# Patient Record
Sex: MALE | URBAN - NONMETROPOLITAN AREA
[De-identification: names, ages, dates, MRNs, and addresses within clinical notes are randomized per-mention and may not be internally consistent; named-entity substitution may affect disease eponyms.]

---

## 2023-03-27 ENCOUNTER — APPOINTMENT (OUTPATIENT)
Dept: URBAN - NONMETROPOLITAN AREA CLINIC 59 | Age: 63
Setting detail: DERMATOLOGY
End: 2023-04-07

## 2023-03-27 DIAGNOSIS — D22 MELANOCYTIC NEVI: ICD-10-CM

## 2023-03-27 DIAGNOSIS — D18.0 HEMANGIOMA: ICD-10-CM

## 2023-03-27 DIAGNOSIS — D49.2 NEOPLASM OF UNSPECIFIED BEHAVIOR OF BONE, SOFT TISSUE, AND SKIN: ICD-10-CM

## 2023-03-27 DIAGNOSIS — L81.4 OTHER MELANIN HYPERPIGMENTATION: ICD-10-CM

## 2023-03-27 DIAGNOSIS — L57.0 ACTINIC KERATOSIS: ICD-10-CM

## 2023-03-27 DIAGNOSIS — Z85.828 PERSONAL HISTORY OF OTHER MALIGNANT NEOPLASM OF SKIN: ICD-10-CM

## 2023-03-27 DIAGNOSIS — L20.89 OTHER ATOPIC DERMATITIS: ICD-10-CM

## 2023-03-27 DIAGNOSIS — L82.1 OTHER SEBORRHEIC KERATOSIS: ICD-10-CM

## 2023-03-27 PROBLEM — D22.5 MELANOCYTIC NEVI OF TRUNK: Status: ACTIVE | Noted: 2023-03-27

## 2023-03-27 PROBLEM — D18.01 HEMANGIOMA OF SKIN AND SUBCUTANEOUS TISSUE: Status: ACTIVE | Noted: 2023-03-27

## 2023-03-27 PROBLEM — L20.84 INTRINSIC (ALLERGIC) ECZEMA: Status: ACTIVE | Noted: 2023-03-27

## 2023-03-27 PROCEDURE — OTHER COUNSELING: OTHER

## 2023-03-27 PROCEDURE — OTHER MIPS QUALITY: OTHER

## 2023-03-27 PROCEDURE — OTHER PRESCRIPTION MEDICATION MANAGEMENT: OTHER

## 2023-03-27 PROCEDURE — 17003 DESTRUCT PREMALG LES 2-14: CPT

## 2023-03-27 PROCEDURE — 17000 DESTRUCT PREMALG LESION: CPT | Mod: 59

## 2023-03-27 PROCEDURE — 11102 TANGNTL BX SKIN SINGLE LES: CPT | Mod: 59

## 2023-03-27 PROCEDURE — 99213 OFFICE O/P EST LOW 20 MIN: CPT | Mod: 25

## 2023-03-27 PROCEDURE — OTHER LIQUID NITROGEN: OTHER

## 2023-03-27 PROCEDURE — A4550 SURGICAL TRAYS: HCPCS

## 2023-03-27 PROCEDURE — OTHER SHAVE REMOVAL: OTHER

## 2023-03-27 PROCEDURE — 11301 SHAVE SKIN LESION 0.6-1.0 CM: CPT

## 2023-03-27 PROCEDURE — OTHER BIOPSY BY SHAVE METHOD: OTHER

## 2023-03-27 PROCEDURE — OTHER PRESCRIPTION: OTHER

## 2023-03-27 PROCEDURE — 11103 TANGNTL BX SKIN EA SEP/ADDL: CPT

## 2023-03-27 PROCEDURE — OTHER MEDICATION COUNSELING: OTHER

## 2023-03-27 RX ORDER — BETAMETHASONE DIPROPIONATE 0.5 MG/G
CREAM TOPICAL BID
Qty: 45 | Refills: 1 | Status: ERX | COMMUNITY
Start: 2023-03-27

## 2023-03-27 ASSESSMENT — LOCATION SIMPLE DESCRIPTION DERM
LOCATION SIMPLE: RIGHT CHEEK
LOCATION SIMPLE: CHEST
LOCATION SIMPLE: LEFT EYEBROW
LOCATION SIMPLE: ABDOMEN
LOCATION SIMPLE: LEFT CHEEK
LOCATION SIMPLE: LEFT BREAST
LOCATION SIMPLE: RIGHT CALF
LOCATION SIMPLE: LEFT CALF
LOCATION SIMPLE: LEFT THIGH

## 2023-03-27 ASSESSMENT — LOCATION DETAILED DESCRIPTION DERM
LOCATION DETAILED: RIGHT SUPERIOR CENTRAL MALAR CHEEK
LOCATION DETAILED: LEFT ANTERIOR PROXIMAL THIGH
LOCATION DETAILED: MIDDLE STERNUM
LOCATION DETAILED: LEFT SUPERIOR CENTRAL MALAR CHEEK
LOCATION DETAILED: PERIUMBILICAL SKIN
LOCATION DETAILED: LEFT LATERAL ABDOMEN
LOCATION DETAILED: RIGHT PROXIMAL CALF
LOCATION DETAILED: LEFT PROXIMAL CALF
LOCATION DETAILED: LOWER STERNUM
LOCATION DETAILED: LEFT CENTRAL EYEBROW
LOCATION DETAILED: LEFT MEDIAL BREAST 8-9:00 REGION

## 2023-03-27 ASSESSMENT — LOCATION ZONE DERM
LOCATION ZONE: FACE
LOCATION ZONE: TRUNK
LOCATION ZONE: LEG

## 2023-03-27 NOTE — PROCEDURE: PRESCRIPTION MEDICATION MANAGEMENT
Initiate Treatment: Betamethasone cream AAA BID 2 weeks on 2 weeks off \\nVaseline to cracks, fold of the skin
Detail Level: Zone
Render In Strict Bullet Format?: No

## 2023-03-27 NOTE — PROCEDURE: LIQUID NITROGEN
Consent: The patient's consent was obtained including but not limited to risks of crusting, scabbing, blistering, scarring, darker or lighter pigmentary change, recurrence, incomplete removal and infection.
Duration Of Freeze Thaw-Cycle (Seconds): 5
Detail Level: Detailed
Render Post-Care Instructions In Note?: no
Post-Care Instructions: I reviewed with the patient in detail post-care instructions. Patient is to wear sunprotection, and avoid picking at any of the treated lesions. Pt may apply Vaseline to crusted or scabbing areas.
Show Aperture Variable?: Yes
Number Of Freeze-Thaw Cycles: 3 freeze-thaw cycles
Application Tool (Optional): Liquid Nitrogen Sprayer

## 2023-03-27 NOTE — PROCEDURE: SHAVE REMOVAL
Notification Instructions: Patient will be notified of pathology results. However, patient instructed to call the office if not contacted within 2 weeks.
Medical Necessity Information: It is in your best interest to select a reason for this procedure from the list below. All of these items fulfill various CMS LCD requirements except the new and changing color options.
Consent was obtained from the patient. The risks and benefits to therapy were discussed in detail. Specifically, the risks of infection, scarring, bleeding, prolonged wound healing, incomplete removal, allergy to anesthesia, nerve injury and recurrence were addressed. Prior to the procedure, the treatment site was clearly identified and confirmed by the patient. All components of Universal Protocol/PAUSE Rule completed.
Wound Care: Petrolatum
Hide Shave Removal Depth?: No
Depth Of Shave: dermis
Body Location Override (Optional - Billing Will Still Be Based On Selected Body Map Location If Applicable): left pubic area
Anesthesia Type: 1% lidocaine with epinephrine
Detail Level: Detailed
X Size Of Lesion In Cm (Optional): 0
Size Of Lesion In Cm (Required): 0.6
Post-Care Instructions: I reviewed with the patient in detail post-care instructions. Patient is to keep the biopsy site dry overnight, and then apply bacitracin twice daily until healed. Patient may apply hydrogen peroxide soaks to remove any crusting.
Medical Necessity Clause: This procedure was medically necessary because the lesion that was treated was:
Hemostasis: Drysol
Biopsy Method: Personna blade
Was A Bandage Applied: Yes
Billing Type: Third-Party Bill

## 2023-03-27 NOTE — PROCEDURE: BIOPSY BY SHAVE METHOD
Hide Accession Number?: No
Hemostasis: Drysol
Anesthesia Type: 1% lidocaine with epinephrine
Biopsy Method: Personna blade
Billing Type: Third-Party Bill
Consent: Written consent was obtained and risks were reviewed including but not limited to scarring, infection, bleeding, scabbing, incomplete removal, nerve damage and allergy to anesthesia.
Cryotherapy Text: The wound bed was treated with cryotherapy after the biopsy was performed.
Post-Care Instructions: I reviewed with the patient in detail post-care instructions. Patient is to keep the biopsy site dry overnight, and then apply bacitracin twice daily until healed. Patient may apply hydrogen peroxide soaks to remove any crusting.
Curettage Text: The wound bed was treated with curettage after the biopsy was performed.
Information: Selecting Yes will display possible errors in your note based on the variables you have selected. This validation is only offered as a suggestion for you. PLEASE NOTE THAT THE VALIDATION TEXT WILL BE REMOVED WHEN YOU FINALIZE YOUR NOTE. IF YOU WANT TO FAX A PRELIMINARY NOTE YOU WILL NEED TO TOGGLE THIS TO 'NO' IF YOU DO NOT WANT IT IN YOUR FAXED NOTE.
Electrodesiccation And Curettage Text: The wound bed was treated with electrodesiccation and curettage after the biopsy was performed.
Electrodesiccation Text: The wound bed was treated with electrodesiccation after the biopsy was performed.
Silver Nitrate Text: The wound bed was treated with silver nitrate after the biopsy was performed.
Bill For Surgical Tray: yes
Biopsy Type: H and E
Additional Anesthesia Volume In Cc (Will Not Render If 0): 0
Detail Level: Detailed
Notification Instructions: Patient will be notified of biopsy results. However, patient instructed to call the office if not contacted within 2 weeks.
Depth Of Biopsy: dermis
Dressing: bandage
Body Location Override (Optional - Billing Will Still Be Based On Selected Body Map Location If Applicable): left lower abdomen superior
Body Location Override (Optional - Billing Will Still Be Based On Selected Body Map Location If Applicable): right malar cheek
Anesthesia Volume In Cc (Will Not Render If 0): 0.5
Wound Care: Petrolatum
Size Of Lesion In Cm: 1
Type Of Destruction Used: Curettage
Body Location Override (Optional - Billing Will Still Be Based On Selected Body Map Location If Applicable): left lower abdomen inferior

## 2023-05-03 ENCOUNTER — APPOINTMENT (OUTPATIENT)
Dept: URBAN - NONMETROPOLITAN AREA CLINIC 59 | Age: 63
Setting detail: DERMATOLOGY
End: 2023-05-03

## 2023-05-03 PROBLEM — C44.329 SQUAMOUS CELL CARCINOMA OF SKIN OF OTHER PARTS OF FACE: Status: ACTIVE | Noted: 2023-05-03

## 2023-05-03 PROCEDURE — OTHER MOHS SURGERY: OTHER

## 2023-05-03 PROCEDURE — A4550 SURGICAL TRAYS: HCPCS

## 2023-05-03 PROCEDURE — OTHER PRESCRIPTION MEDICATION MANAGEMENT: OTHER

## 2023-05-03 PROCEDURE — OTHER COUNSELING: OTHER

## 2023-05-03 PROCEDURE — OTHER PRESCRIPTION: OTHER

## 2023-05-03 PROCEDURE — 17311 MOHS 1 STAGE H/N/HF/G: CPT

## 2023-05-03 PROCEDURE — 14041 TIS TRNFR F/C/C/M/N/A/G/H/F: CPT

## 2023-05-03 RX ORDER — AMOXICILLIN 500 MG/1
CAPSULE ORAL
Qty: 21 | Refills: 0 | Status: ERX | COMMUNITY
Start: 2023-05-03

## 2023-05-03 NOTE — PROCEDURE: MOHS SURGERY
I left detailed message asking patient to return office call to schedule to have her glycohemoglobin checked.   Epidermal Closure Graft Donor Site (Optional): simple interrupted

## 2023-05-03 NOTE — PROCEDURE: PRESCRIPTION MEDICATION MANAGEMENT
Detail Level: Zone
Render In Strict Bullet Format?: No
Initiate Treatment: Amoxicillin 500mg TID for one week

## 2023-05-03 NOTE — PROCEDURE: MOHS SURGERY
40.4 Hemigard Intro: Due to skin fragility and wound tension, it was decided to use HEMIGARD adhesive retention suture devices to permit a linear closure. The skin was cleaned and dried for a 6cm distance away from the wound. Excessive hair, if present, was removed to allow for adhesion.

## 2023-05-10 ENCOUNTER — APPOINTMENT (OUTPATIENT)
Dept: URBAN - NONMETROPOLITAN AREA CLINIC 59 | Age: 63
Setting detail: DERMATOLOGY
End: 2023-05-12

## 2023-05-10 PROBLEM — C44.529 SQUAMOUS CELL CARCINOMA OF SKIN OF OTHER PART OF TRUNK: Status: ACTIVE | Noted: 2023-05-10

## 2023-05-10 PROCEDURE — OTHER MEDICATION COUNSELING: OTHER

## 2023-05-10 PROCEDURE — 14301 TIS TRNFR ANY 30.1-60 SQ CM: CPT | Mod: 79

## 2023-05-10 PROCEDURE — OTHER PRESCRIPTION: OTHER

## 2023-05-10 PROCEDURE — 14302 TIS TRNFR ADDL 30 SQ CM: CPT | Mod: 79

## 2023-05-10 PROCEDURE — OTHER EXCISION: OTHER

## 2023-05-10 PROCEDURE — OTHER COUNSELING: OTHER

## 2023-05-10 PROCEDURE — A4550 SURGICAL TRAYS: HCPCS

## 2023-05-10 RX ORDER — AMOXICILLIN 500 MG/1
CAPSULE ORAL TID
Qty: 30 | Refills: 0 | Status: ERX

## 2023-05-10 NOTE — PROCEDURE: EXCISION
0 Hour(s) 30 Minute(s) Complex Repair And Dermal Autograft Text: The defect edges were debeveled with a #15 scalpel blade.  The primary defect was closed partially with a complex linear closure.  Given the location of the defect, shape of the defect and the proximity to free margins an dermal autograft was deemed most appropriate to repair the remaining defect.  The graft was trimmed to fit the size of the remaining defect.  The graft was then placed in the primary defect, oriented appropriately, and sutured into place.

## 2023-05-10 NOTE — PROCEDURE: MEDICATION COUNSELING
Rest  Fluids  Ibuprofen or tylenol as needed  Symptoms should improve gradually over the next 3-5 days.      If symptoms  persist or  worsen then the patient will need to reassessed.  If the patient cannot be seen by their PCP, then the patient is recommended to return to Immediate Care or to seek care in Emergency Room if Immediate Care is not available.              Rest   Fluids  tylenol as needed    Isolation recommended-see below.  Follow up with your primary in 5-7 days if issues persist.    Return if worsening symptoms or seek care in ER.          Covid-19 must be suspected for any person who has respiratory symptoms, unexplained fever, or other Covid-like symptoms at this time, and no test will determine with 100% certainty if you have COVID-19, so appropriate precautions should continue to be taken.        What does it mean if I have a negative test result?    A negative test result means that proteins from the virus that causes COVID-19 were not found in your sample. It is possible for this test to give a negative result that is incorrect (false negative) in some people with COVID19. This means that you could possibly still have COVID19 even though the test is negative.   The amount of antigen in a sample may decrease the longer you have symptoms of infection. Specimens collected after you have had symptoms for more than seven days may be more likely to be negative compared to a molecular assay.     What are the differences between antigen tests and other COVID-19 tests?  There are different kinds of tests for COVID-19.  Molecular tests (also known as PCR tests) detect  genetic material from the virus. Antigen tests detect proteins from the virus. Antigen tests are very specific for the virus, but are not as sensitive as molecular tests. This means that a positive result is highly accurate, but a negative result does not rule out infection.   the CDC currently recommends that you should stay home until  three things have happened:     • You have had no fever for at least 24 hours (no fever without the use of medicine that reduces fevers)    AND    • Other symptoms have improved (for example, when your cough or shortness of breath has Improved)    AND    • At least 10 days have passed since your symptoms first appeared.           What are the symptoms of COVID-19?  Symptoms usually start 4 or 5 days after a person is infected with the virus. But in some people, it can take up to 2 weeks for symptoms to appear. Some people never show symptoms at all.    When symptoms do happen, they can include:    ?Fever    ?Cough    ?Trouble breathing    ?Feeling tired    ?Shaking chills    ?Muscle aches    ?Headache    ?Sore throat    ?Problems with sense of smell or taste    Some people have digestive problems like nausea or diarrhea. There have also been some reports of rashes or other skin symptoms. For example, some people with COVID-19 get reddish-purple spots on their fingers or toes. But it's not clear why or how often this happens.    For most people, symptoms will get better within a few weeks. But in others, COVID-19 can lead to serious problems like pneumonia, not getting enough oxygen, heart problems, or even death. This risk gets higher as people get older. It is also higher in people who have other health problems like serious heart disease, chronic kidney disease, type 2 diabetes, chronic obstructive pulmonary disease (COPD), sickle cell disease, or obesity. People who have a weak immune system for other reasons (for example, HIV infection or certain medicines), asthma, cystic fibrosis, type 1 diabetes, or high blood pressure might also be at higher risk for serious problems.       Community Health Information for COVID-19 for you to review.         FOLLOW THE STEPS BELOW: If you are sick with COVID-19 or think you might have it, follow the steps below to help protect other people in your home and community.      1.Stay home except to get medical care    Stay home: People who are mildly ill with COVID-19 are able to recover at home. Do not leave, except to get medical care. Do not visit public areas.    Stay in touch with your doctor. Call before you get medical care. Be sure to get care if you feel worse or you think it is an emergency.    Avoid public transportation: Avoid using public transportation, ride-sharing, or taxis.     Separate yourself from other people in your home, this is known as home isolation     Stay away from others: As much as possible, you should stay in a specific \"sick room\" and away from other people in your home. Use a separate     bathroom, if available.      Limit contact with pets & animals: You should restrict contact with pets and other animals, just like you would around other people. Although there have not been reports of pets or other animals becoming sick with COVID-19, it is still recommended that people with the virus limit contact with animals until more information is known.   When possible, have another member of your household care for your animals while you are sick with COVID-19. If you must care for your pet or be around animals while you are sick, wash your hands before and after you interact with them. See COVID-19 and Animals for more information.     2.Call ahead before visiting your doctor    Call ahead: If you have a medical appointment, call your doctor's office or emergency department, and tell them you have or may have COVID-19.    This will help the office protect themselves and other patients.     3. Safety Precaution    Wear a facemask if you are sick   If you are sick: You should wear a facemask when you are around other people and before you enter a healthcare provider's office.   If you are caring for others: If the person who is sick is not able to wear a facemask (for example, because it causes trouble breathing), then people who live in the home should stay  in a different room. When caregivers enter the room of the sick person, they should wear a facemask. Visitors, other than caregivers, are not recommended.     Cover your coughs and sneezes   Cover: Cover your mouth and nose with a tissue when you cough or sneeze.   Dispose: Throw used tissues in a lined trash can.   Wash hands: Immediately wash your hands with soap and water for at least 20 seconds. If soap and water are not available, clean your hands with an alcohol-based hand  that contains at least 60% alcohol.     Clean your hands often   Wash hands: Wash your hands often with soap and water for at least 20 seconds. This is especially important after blowing your nose, coughing, or sneezing; going to the bathroom; and before eating or preparing food.   Hand : If soap and water are not available, use an alcohol-based hand  with at least 60% alcohol, covering all surfaces of your hands and rubbing them together until they feel dry.   Soap and water: Soap and water are the best option, especially if hands are visibly dirty.   Avoid touching: Avoid touching your eyes, nose, and mouth with unwashed hands.     Avoid sharing personal household items   Do not share: Do not share dishes, drinking glasses, cups, eating utensils, towels, or bedding with other people in your home.  Wash thoroughly after use: After using these items, wash them thoroughly with soap and water or put in the .     Clean all \"high-touch\" surfaces everyday   Clean high-touch surfaces in your isolation area (\"sick room\" and bathroom) every day; let a caregiver clean and disinfect high-touch surfaces in other areas of the home.     Clean and disinfect:   Routinely clean high-touch surfaces in your \"sick room\" and bathroom. Let someone else clean and disinfect surfaces in common areas, but not your bedroom and bathroom. If a caregiver or other person needs to clean and disinfect a sick person's bedroom or  bathroom, they should do so on an as-needed basis. The caregiver/other person should wear a mask and wait as long as possible after the sick person has used the bathroom.      High-touch surfaces include phones, remote controls, counters, tabletops, doorknobs, bathroom fixtures, toilets, keyboards, tablets, and bedside tables.      Clean and disinfect areas that may have blood, stool, or body fluids on them.      Household  and disinfectants:   Clean the area or item with soap and water or another detergent if it is dirty. Then, use a household disinfectant. Be sure to follow the instructions on the label to ensure safe and effective use of the product. Many products recommend keeping the surface wet for several minutes to ensure germs are killed. Many also recommend precautions such as wearing gloves and making sure you have good ventilation during use of the product.   Most EPA-registered household disinfectants should be effective. A full list of disinfectants can be found hereexternal icon.        4. Monitor your symptoms     • Seek medical attention, but call first: Seek medical care right away if your illness is worsening (for example, if you have difficulty breathing). Call your doctor before going in: Before going to the doctor's office or emergency room, call ahead and tell them your symptoms. They will tell you what to do.     • Wear a facemask: If possible, put on a facemask before you enter the building. If you can't put on a facemask, try to keep a safe distance from other people (at least 6 feet away). This will help protect the people in the office or waiting room.     • Follow care instructions from your healthcare provider and local health department: Your local health authorities will give instructions on checking your symptoms and reporting information.       5. When and How to discontinue home isolation     People with COVID-19 who have stayed home (home isolated) can stop home isolation  under the following conditions:     You can leave home after these three things have happened:     1. You have had no fever for at least 24  hours (that is three full days of no fever without the use medicine that reduces fevers) AND  2.  other symptoms have improved (for example, when your cough or shortness of breath have improved) AND  3.  at least 10 days have passed since your symptoms first appeared     In all cases, follow the guidance of your healthcare provider and local health department.   The decision to stop home isolation should be made in consultation with your healthcare provider and state and local health departments. Local decisions depend on local circumstances.       6. If you develop emergency warning signs for COVID-19 get medical attention immediately. Emergency warning signs include*:       Difficulty breathing or shortness of breath    Persistent pain or pressure in the chest    New confusion or inability to arouse    Bluish lips or face     *This list is not all inclusive. Please consult your medical provider for any other symptoms that are severe or concerning.  Call 911 if you have a medical emergency: If you have a medical emergency and need to call 911, notify the  that you have or think you might have, COVID-19. If possible, put on a facemask before medical help arrives.          Otezla Pregnancy And Lactation Text: This medication is Pregnancy Category C and it isn't known if it is safe during pregnancy. It is unknown if it is excreted in breast milk.

## 2023-05-10 NOTE — PROCEDURE: EXCISION
Body Location Override (Optional - Billing Will Still Be Based On Selected Body Map Location If Applicable): left lower abdomen inferior/superior

## 2023-05-10 NOTE — PROCEDURE: EXCISION
Path Notes (To The Dermatopathologist): Biopsy proven. Please check margins. Previous accession #[id number] Path Notes (To The Dermatopathologist): Biopsy proven. Please check margins. Previous accession #[id number]

## 2023-05-10 NOTE — PROCEDURE: MEDICATION COUNSELING
Event Note Event Note GIB/Event Note Nutrition Services Finasteride Pregnancy And Lactation Text: This medication is absolutely contraindicated during pregnancy. It is unknown if it is excreted in breast milk.

## 2023-05-10 NOTE — PROCEDURE: MEDICATION COUNSELING
All other VS stable. Tranexamic Acid Counseling:  Patient advised of the small risk of bleeding problems with tranexamic acid. They were also instructed to call if they developed any nausea, vomiting or diarrhea. All of the patient's questions and concerns were addressed.

## 2023-05-24 RX ORDER — AMOXICILLIN 500 MG/1
CAPSULE ORAL TID
Qty: 30 | Refills: 0 | Status: ERX

## 2023-05-25 ENCOUNTER — APPOINTMENT (OUTPATIENT)
Dept: URBAN - NONMETROPOLITAN AREA CLINIC 59 | Age: 63
Setting detail: DERMATOLOGY
End: 2023-05-29

## 2023-05-25 DIAGNOSIS — D22 MELANOCYTIC NEVI: ICD-10-CM

## 2023-05-25 PROBLEM — D22.72 MELANOCYTIC NEVI OF LEFT LOWER LIMB, INCLUDING HIP: Status: ACTIVE | Noted: 2023-05-25

## 2023-05-25 PROCEDURE — 12032 INTMD RPR S/A/T/EXT 2.6-7.5: CPT | Mod: 79

## 2023-05-25 PROCEDURE — 11402 EXC TR-EXT B9+MARG 1.1-2 CM: CPT | Mod: 79

## 2023-05-25 PROCEDURE — OTHER EXCISION: OTHER

## 2023-05-25 PROCEDURE — A4550 SURGICAL TRAYS: HCPCS

## 2023-05-25 ASSESSMENT — LOCATION ZONE DERM: LOCATION ZONE: LEG

## 2023-05-25 ASSESSMENT — LOCATION DETAILED DESCRIPTION DERM: LOCATION DETAILED: LEFT ANTERIOR PROXIMAL THIGH

## 2023-05-25 ASSESSMENT — LOCATION SIMPLE DESCRIPTION DERM: LOCATION SIMPLE: LEFT THIGH

## 2023-05-25 NOTE — PROCEDURE: EXCISION
Body Location Override (Optional - Billing Will Still Be Based On Selected Body Map Location If Applicable): left pubic area

## 2023-05-25 NOTE — PROCEDURE: EXCISION
Allergies can be treated with the following over the counter medications:    1. Antihistamines:    · Allegra/fexofenadine  2 to 11 years: 30 mg twice a day.  · Zyrtec/cetirizine  6 to 11 years: 1 or 2 tsp. (5 or 10 mg) once a day.  · Claritin/Alavert/loratadine   7 years and older: 10 mg once a day.  · Xyzal/levocetirizine 6 to 11 years: 1 teaspoon (5 mL) or ½ tablet once a day in PM.   Antihistamines are dosed daily to help with itching, runny nose.  Antihistamines can cause drowsiness.           2. Steroid nose sprays:    · Flonase, Flonase Sensimist, or ClariSpray  4 years and older  · Nasacort Allergy 24hr 2 years and older  · Rhinocort Allergy 6 years and older  These steroid nose sprays are available over the counter. Use 2 sprays each nostril daily during symptomatic months, and 1 spray each nostril daily at other times.  These will help with nasal congestion and eye symptoms. These work better if used daily for 2 weeks or longer at a time.        3. Allergy eye drops.  Can be used as needed to decrease eye itching/redness/discomfort.   · Alaway  · Naphcon-A  · OcuHist  · Opcon-A  · Vasocon-A  · Visine A   · Zaditor   · Pataday        Vermilion Border Text: The closure involved the vermilion border.

## 2023-05-31 ENCOUNTER — APPOINTMENT (OUTPATIENT)
Dept: URBAN - NONMETROPOLITAN AREA CLINIC 59 | Age: 63
Setting detail: DERMATOLOGY
End: 2023-05-31

## 2023-05-31 DIAGNOSIS — Z48.02 ENCOUNTER FOR REMOVAL OF SUTURES: ICD-10-CM

## 2023-05-31 PROCEDURE — 99024 POSTOP FOLLOW-UP VISIT: CPT

## 2023-05-31 PROCEDURE — OTHER SUTURE REMOVAL (GLOBAL PERIOD): OTHER

## 2023-05-31 ASSESSMENT — LOCATION ZONE DERM: LOCATION ZONE: TRUNK

## 2023-05-31 ASSESSMENT — LOCATION SIMPLE DESCRIPTION DERM: LOCATION SIMPLE: ABDOMEN

## 2023-05-31 ASSESSMENT — LOCATION DETAILED DESCRIPTION DERM: LOCATION DETAILED: LEFT LATERAL ABDOMEN

## 2023-05-31 NOTE — PROCEDURE: SUTURE REMOVAL (GLOBAL PERIOD)
Add 73507 Cpt? (Important Note: In 2017 The Use Of 25581 Is Being Tracked By Cms To Determine Future Global Period Reimbursement For Global Periods): yes
Body Location Override (Optional - Billing Will Still Be Based On Selected Body Map Location If Applicable): left lower abdomen/superior
Detail Level: Detailed

## 2023-06-06 ENCOUNTER — APPOINTMENT (OUTPATIENT)
Dept: URBAN - NONMETROPOLITAN AREA CLINIC 59 | Age: 63
Setting detail: DERMATOLOGY
End: 2023-06-12

## 2023-06-06 DIAGNOSIS — Z48.817 ENCOUNTER FOR SURGICAL AFTERCARE FOLLOWING SURGERY ON THE SKIN AND SUBCUTANEOUS TISSUE: ICD-10-CM

## 2023-06-06 DIAGNOSIS — Z48.02 ENCOUNTER FOR REMOVAL OF SUTURES: ICD-10-CM

## 2023-06-06 PROCEDURE — OTHER POST-OP WOUND CHECK: OTHER

## 2023-06-06 PROCEDURE — OTHER SUTURE REMOVAL (GLOBAL PERIOD): OTHER

## 2023-06-06 PROCEDURE — 99024 POSTOP FOLLOW-UP VISIT: CPT

## 2023-06-06 PROCEDURE — OTHER COUNSELING: OTHER

## 2023-06-06 ASSESSMENT — LOCATION ZONE DERM
LOCATION ZONE: LEG
LOCATION ZONE: TRUNK

## 2023-06-06 ASSESSMENT — LOCATION DETAILED DESCRIPTION DERM
LOCATION DETAILED: LEFT ANTERIOR PROXIMAL THIGH
LOCATION DETAILED: LEFT LATERAL ABDOMEN

## 2023-06-06 ASSESSMENT — LOCATION SIMPLE DESCRIPTION DERM
LOCATION SIMPLE: LEFT THIGH
LOCATION SIMPLE: ABDOMEN

## 2023-06-06 NOTE — PROCEDURE: SUTURE REMOVAL (GLOBAL PERIOD)
Detail Level: Detailed
Body Location Override (Optional - Billing Will Still Be Based On Selected Body Map Location If Applicable): left pubic area
Add 72659 Cpt? (Important Note: In 2017 The Use Of 83283 Is Being Tracked By Cms To Determine Future Global Period Reimbursement For Global Periods): yes

## 2023-06-06 NOTE — PROCEDURE: POST-OP WOUND CHECK
Detail Level: Detailed
Body Location Override (Optional - Billing Will Still Be Based On Selected Body Map Location If Applicable): left lower abdomen inferior/superior
Add 73415 Cpt? (Important Note: In 2017 The Use Of 30429 Is Being Tracked By Cms To Determine Future Global Period Reimbursement For Global Periods): yes

## 2023-07-28 NOTE — PROCEDURE: MEDICATION COUNSELING
34w7d Cephalexin Counseling: I counseled the patient regarding use of cephalexin as an antibiotic for prophylactic and/or therapeutic purposes. Cephalexin (commonly prescribed under brand name Keflex) is a cephalosporin antibiotic which is active against numerous classes of bacteria, including most skin bacteria. Side effects may include nausea, diarrhea, gastrointestinal upset, rash, hives, yeast infections, and in rare cases, hepatitis, kidney disease, seizures, fever, confusion, neurologic symptoms, and others. Patients with severe allergies to penicillin medications are cautioned that there is about a 10% incidence of cross-reactivity with cephalosporins. When possible, patients with penicillin allergies should use alternatives to cephalosporins for antibiotic therapy.

## 2023-09-09 NOTE — PROCEDURE: EXCISION
MD//CHLOE/KURTIS Skin Substitute Text: The defect edges were debeveled with a #15 scalpel blade.  Given the location of the defect, shape of the defect and the proximity to free margins a skin substitute graft was deemed most appropriate.  The graft material was trimmed to fit the size of the defect. The graft was then placed in the primary defect and oriented appropriately.

## 2023-12-07 ENCOUNTER — APPOINTMENT (OUTPATIENT)
Dept: URBAN - NONMETROPOLITAN AREA CLINIC 59 | Age: 63
Setting detail: DERMATOLOGY
End: 2023-12-08

## 2023-12-07 DIAGNOSIS — Z87.2 PERSONAL HISTORY OF DISEASES OF THE SKIN AND SUBCUTANEOUS TISSUE: ICD-10-CM

## 2023-12-07 DIAGNOSIS — Z71.89 OTHER SPECIFIED COUNSELING: ICD-10-CM

## 2023-12-07 DIAGNOSIS — L82.1 OTHER SEBORRHEIC KERATOSIS: ICD-10-CM

## 2023-12-07 DIAGNOSIS — Z85.828 PERSONAL HISTORY OF OTHER MALIGNANT NEOPLASM OF SKIN: ICD-10-CM

## 2023-12-07 DIAGNOSIS — L81.4 OTHER MELANIN HYPERPIGMENTATION: ICD-10-CM

## 2023-12-07 DIAGNOSIS — L57.0 ACTINIC KERATOSIS: ICD-10-CM

## 2023-12-07 DIAGNOSIS — D22 MELANOCYTIC NEVI: ICD-10-CM

## 2023-12-07 DIAGNOSIS — D18.0 HEMANGIOMA: ICD-10-CM

## 2023-12-07 PROBLEM — D22.62 MELANOCYTIC NEVI OF LEFT UPPER LIMB, INCLUDING SHOULDER: Status: ACTIVE | Noted: 2023-12-07

## 2023-12-07 PROBLEM — D22.39 MELANOCYTIC NEVI OF OTHER PARTS OF FACE: Status: ACTIVE | Noted: 2023-12-07

## 2023-12-07 PROBLEM — D22.72 MELANOCYTIC NEVI OF LEFT LOWER LIMB, INCLUDING HIP: Status: ACTIVE | Noted: 2023-12-07

## 2023-12-07 PROBLEM — D22.71 MELANOCYTIC NEVI OF RIGHT LOWER LIMB, INCLUDING HIP: Status: ACTIVE | Noted: 2023-12-07

## 2023-12-07 PROBLEM — D22.5 MELANOCYTIC NEVI OF TRUNK: Status: ACTIVE | Noted: 2023-12-07

## 2023-12-07 PROBLEM — D18.01 HEMANGIOMA OF SKIN AND SUBCUTANEOUS TISSUE: Status: ACTIVE | Noted: 2023-12-07

## 2023-12-07 PROCEDURE — OTHER MIPS QUALITY: OTHER

## 2023-12-07 PROCEDURE — OTHER COUNSELING: OTHER

## 2023-12-07 PROCEDURE — 17000 DESTRUCT PREMALG LESION: CPT

## 2023-12-07 PROCEDURE — OTHER SUNSCREEN RECOMMENDATIONS: OTHER

## 2023-12-07 PROCEDURE — OTHER LIQUID NITROGEN: OTHER

## 2023-12-07 PROCEDURE — 99213 OFFICE O/P EST LOW 20 MIN: CPT | Mod: 25

## 2023-12-07 ASSESSMENT — LOCATION DETAILED DESCRIPTION DERM
LOCATION DETAILED: LEFT ANTERIOR PROXIMAL THIGH
LOCATION DETAILED: LEFT INFERIOR MEDIAL UPPER BACK
LOCATION DETAILED: RIGHT ANTERIOR PROXIMAL THIGH
LOCATION DETAILED: LEFT MEDIAL BREAST 8-9:00 REGION
LOCATION DETAILED: LEFT ANTECUBITAL SKIN
LOCATION DETAILED: RIGHT MEDIAL SUPERIOR CHEST
LOCATION DETAILED: LEFT ANTERIOR PROXIMAL UPPER ARM
LOCATION DETAILED: RIGHT LATERAL SUPERIOR CHEST
LOCATION DETAILED: PERIUMBILICAL SKIN
LOCATION DETAILED: LEFT SUPERIOR CENTRAL MALAR CHEEK
LOCATION DETAILED: EPIGASTRIC SKIN
LOCATION DETAILED: LEFT INFERIOR MEDIAL MALAR CHEEK

## 2023-12-07 ASSESSMENT — LOCATION SIMPLE DESCRIPTION DERM
LOCATION SIMPLE: LEFT THIGH
LOCATION SIMPLE: LEFT CHEEK
LOCATION SIMPLE: ABDOMEN
LOCATION SIMPLE: LEFT UPPER ARM
LOCATION SIMPLE: LEFT UPPER BACK
LOCATION SIMPLE: CHEST
LOCATION SIMPLE: LEFT BREAST
LOCATION SIMPLE: RIGHT THIGH

## 2023-12-07 ASSESSMENT — LOCATION ZONE DERM
LOCATION ZONE: ARM
LOCATION ZONE: LEG
LOCATION ZONE: FACE
LOCATION ZONE: TRUNK

## 2023-12-07 NOTE — PROCEDURE: MIPS QUALITY
Quality 111:Pneumonia Vaccination Status For Older Adults: Patient received any pneumococcal conjugate or polysaccharide vaccine on or after their 60th birthday and before the end of the measurement period
Quality 431: Preventive Care And Screening: Unhealthy Alcohol Use - Screening: Patient not identified as an unhealthy alcohol user when screened for unhealthy alcohol use using a systematic screening method
Quality 226: Preventive Care And Screening: Tobacco Use: Screening And Cessation Intervention: Patient screened for tobacco use and is an ex/non-smoker
Detail Level: Detailed
Quality 110: Preventive Care And Screening: Influenza Immunization: Influenza Immunization previously received during influenza season

## 2024-06-13 ENCOUNTER — APPOINTMENT (OUTPATIENT)
Dept: URBAN - NONMETROPOLITAN AREA CLINIC 59 | Age: 64
Setting detail: DERMATOLOGY
End: 2024-06-13

## 2024-06-13 DIAGNOSIS — L30.4 ERYTHEMA INTERTRIGO: ICD-10-CM

## 2024-06-13 DIAGNOSIS — L29.8 OTHER PRURITUS: ICD-10-CM

## 2024-06-13 DIAGNOSIS — Z85.828 PERSONAL HISTORY OF OTHER MALIGNANT NEOPLASM OF SKIN: ICD-10-CM

## 2024-06-13 DIAGNOSIS — Z87.2 PERSONAL HISTORY OF DISEASES OF THE SKIN AND SUBCUTANEOUS TISSUE: ICD-10-CM

## 2024-06-13 DIAGNOSIS — D22 MELANOCYTIC NEVI: ICD-10-CM

## 2024-06-13 DIAGNOSIS — Z71.89 OTHER SPECIFIED COUNSELING: ICD-10-CM

## 2024-06-13 DIAGNOSIS — D18.0 HEMANGIOMA: ICD-10-CM

## 2024-06-13 DIAGNOSIS — L81.4 OTHER MELANIN HYPERPIGMENTATION: ICD-10-CM

## 2024-06-13 DIAGNOSIS — L82.1 OTHER SEBORRHEIC KERATOSIS: ICD-10-CM

## 2024-06-13 PROBLEM — D22.62 MELANOCYTIC NEVI OF LEFT UPPER LIMB, INCLUDING SHOULDER: Status: ACTIVE | Noted: 2024-06-13

## 2024-06-13 PROBLEM — D22.71 MELANOCYTIC NEVI OF RIGHT LOWER LIMB, INCLUDING HIP: Status: ACTIVE | Noted: 2024-06-13

## 2024-06-13 PROBLEM — D22.5 MELANOCYTIC NEVI OF TRUNK: Status: ACTIVE | Noted: 2024-06-13

## 2024-06-13 PROBLEM — D22.72 MELANOCYTIC NEVI OF LEFT LOWER LIMB, INCLUDING HIP: Status: ACTIVE | Noted: 2024-06-13

## 2024-06-13 PROBLEM — D18.01 HEMANGIOMA OF SKIN AND SUBCUTANEOUS TISSUE: Status: ACTIVE | Noted: 2024-06-13

## 2024-06-13 PROBLEM — D22.39 MELANOCYTIC NEVI OF OTHER PARTS OF FACE: Status: ACTIVE | Noted: 2024-06-13

## 2024-06-13 PROCEDURE — OTHER PRESCRIPTION MEDICATION MANAGEMENT: OTHER

## 2024-06-13 PROCEDURE — OTHER COUNSELING: OTHER

## 2024-06-13 PROCEDURE — 99213 OFFICE O/P EST LOW 20 MIN: CPT

## 2024-06-13 PROCEDURE — OTHER SUNSCREEN RECOMMENDATIONS: OTHER

## 2024-06-13 PROCEDURE — OTHER PRESCRIPTION: OTHER

## 2024-06-13 PROCEDURE — OTHER MIPS QUALITY: OTHER

## 2024-06-13 RX ORDER — CLOTRIMAZOLE AND BETAMETHASONE DIPROPIONATE 10; .5 MG/G; MG/G
CREAM TOPICAL
Qty: 45 | Refills: 2 | Status: ERX | COMMUNITY
Start: 2024-06-13

## 2024-06-13 ASSESSMENT — LOCATION SIMPLE DESCRIPTION DERM
LOCATION SIMPLE: LEFT UPPER ARM
LOCATION SIMPLE: LEFT UPPER BACK
LOCATION SIMPLE: GENITALIA
LOCATION SIMPLE: LEFT FOOT
LOCATION SIMPLE: RIGHT FOOT
LOCATION SIMPLE: LEFT BREAST
LOCATION SIMPLE: RIGHT THIGH
LOCATION SIMPLE: LEFT CHEEK
LOCATION SIMPLE: CHEST
LOCATION SIMPLE: GLUTEAL CLEFT
LOCATION SIMPLE: LEFT THIGH
LOCATION SIMPLE: ABDOMEN

## 2024-06-13 ASSESSMENT — LOCATION DETAILED DESCRIPTION DERM
LOCATION DETAILED: RIGHT DORSAL FOOT
LOCATION DETAILED: RIGHT MEDIAL SUPERIOR CHEST
LOCATION DETAILED: LEFT ANTECUBITAL SKIN
LOCATION DETAILED: EPIGASTRIC SKIN
LOCATION DETAILED: PERIUMBILICAL SKIN
LOCATION DETAILED: GENITALIA
LOCATION DETAILED: LEFT ANTERIOR PROXIMAL THIGH
LOCATION DETAILED: LEFT INFERIOR MEDIAL MALAR CHEEK
LOCATION DETAILED: RIGHT ANTERIOR PROXIMAL THIGH
LOCATION DETAILED: LEFT ANTERIOR PROXIMAL UPPER ARM
LOCATION DETAILED: RIGHT LATERAL SUPERIOR CHEST
LOCATION DETAILED: LEFT DORSAL FOOT
LOCATION DETAILED: LEFT INFERIOR MEDIAL UPPER BACK
LOCATION DETAILED: GLUTEAL CLEFT
LOCATION DETAILED: LEFT MEDIAL BREAST 8-9:00 REGION

## 2024-06-13 ASSESSMENT — LOCATION ZONE DERM
LOCATION ZONE: ARM
LOCATION ZONE: TRUNK
LOCATION ZONE: GENITALIA
LOCATION ZONE: FEET
LOCATION ZONE: LEG
LOCATION ZONE: FACE

## 2024-06-13 NOTE — PROCEDURE: PRESCRIPTION MEDICATION MANAGEMENT
Render In Strict Bullet Format?: No
Detail Level: Zone
Initiate Treatment: Lotrisone cream twice daily to AA on the body for up to 2 weeks/month. Repeat for flares
Initiate Treatment: Lotrisone cream to AA on the body bid for up to 2 weeks/month. Repeat for flares

## 2024-12-19 ENCOUNTER — APPOINTMENT (OUTPATIENT)
Dept: URBAN - NONMETROPOLITAN AREA CLINIC 59 | Age: 64
Setting detail: DERMATOLOGY
End: 2024-12-20

## 2024-12-19 DIAGNOSIS — D18.0 HEMANGIOMA: ICD-10-CM

## 2024-12-19 DIAGNOSIS — L81.4 OTHER MELANIN HYPERPIGMENTATION: ICD-10-CM

## 2024-12-19 DIAGNOSIS — D22 MELANOCYTIC NEVI: ICD-10-CM

## 2024-12-19 DIAGNOSIS — Z85.828 PERSONAL HISTORY OF OTHER MALIGNANT NEOPLASM OF SKIN: ICD-10-CM

## 2024-12-19 DIAGNOSIS — L82.1 OTHER SEBORRHEIC KERATOSIS: ICD-10-CM

## 2024-12-19 DIAGNOSIS — Z71.89 OTHER SPECIFIED COUNSELING: ICD-10-CM

## 2024-12-19 DIAGNOSIS — R21 RASH AND OTHER NONSPECIFIC SKIN ERUPTION: ICD-10-CM

## 2024-12-19 DIAGNOSIS — L30.4 ERYTHEMA INTERTRIGO: ICD-10-CM

## 2024-12-19 PROBLEM — D22.5 MELANOCYTIC NEVI OF TRUNK: Status: ACTIVE | Noted: 2024-12-19

## 2024-12-19 PROBLEM — D22.71 MELANOCYTIC NEVI OF RIGHT LOWER LIMB, INCLUDING HIP: Status: ACTIVE | Noted: 2024-12-19

## 2024-12-19 PROBLEM — D22.72 MELANOCYTIC NEVI OF LEFT LOWER LIMB, INCLUDING HIP: Status: ACTIVE | Noted: 2024-12-19

## 2024-12-19 PROBLEM — D22.39 MELANOCYTIC NEVI OF OTHER PARTS OF FACE: Status: ACTIVE | Noted: 2024-12-19

## 2024-12-19 PROBLEM — D22.62 MELANOCYTIC NEVI OF LEFT UPPER LIMB, INCLUDING SHOULDER: Status: ACTIVE | Noted: 2024-12-19

## 2024-12-19 PROBLEM — D18.01 HEMANGIOMA OF SKIN AND SUBCUTANEOUS TISSUE: Status: ACTIVE | Noted: 2024-12-19

## 2024-12-19 PROCEDURE — OTHER MIPS QUALITY: OTHER

## 2024-12-19 PROCEDURE — OTHER DIAGNOSIS COMMENT: OTHER

## 2024-12-19 PROCEDURE — OTHER SUNSCREEN RECOMMENDATIONS: OTHER

## 2024-12-19 PROCEDURE — 99213 OFFICE O/P EST LOW 20 MIN: CPT

## 2024-12-19 PROCEDURE — OTHER PRESCRIPTION: OTHER

## 2024-12-19 PROCEDURE — OTHER PRESCRIPTION MEDICATION MANAGEMENT: OTHER

## 2024-12-19 PROCEDURE — OTHER COUNSELING: OTHER

## 2024-12-19 RX ORDER — CLOTRIMAZOLE AND BETAMETHASONE DIPROPIONATE 10; .5 MG/G; MG/G
CREAM TOPICAL
Qty: 45 | Refills: 2 | Status: ERX

## 2024-12-19 ASSESSMENT — LOCATION DETAILED DESCRIPTION DERM
LOCATION DETAILED: DORSAL PENILE SHAFT
LOCATION DETAILED: LEFT INFERIOR MEDIAL UPPER BACK
LOCATION DETAILED: RIGHT LATERAL SUPERIOR CHEST
LOCATION DETAILED: LEFT INFERIOR MEDIAL MALAR CHEEK
LOCATION DETAILED: DORSAL GLANS PENIS
LOCATION DETAILED: RIGHT MEDIAL SUPERIOR CHEST
LOCATION DETAILED: LEFT ANTERIOR PROXIMAL THIGH
LOCATION DETAILED: RIGHT ANTERIOR PROXIMAL THIGH
LOCATION DETAILED: LEFT ANTERIOR PROXIMAL UPPER ARM
LOCATION DETAILED: LEFT MEDIAL BREAST 8-9:00 REGION
LOCATION DETAILED: PERIUMBILICAL SKIN
LOCATION DETAILED: RIGHT INGUINAL CREASE
LOCATION DETAILED: LEFT ANTECUBITAL SKIN
LOCATION DETAILED: EPIGASTRIC SKIN
LOCATION DETAILED: LEFT INGUINAL CREASE

## 2024-12-19 ASSESSMENT — LOCATION ZONE DERM
LOCATION ZONE: LEG
LOCATION ZONE: ARM
LOCATION ZONE: FACE
LOCATION ZONE: PENIS
LOCATION ZONE: TRUNK

## 2024-12-19 ASSESSMENT — LOCATION SIMPLE DESCRIPTION DERM
LOCATION SIMPLE: CHEST
LOCATION SIMPLE: PENIS
LOCATION SIMPLE: LEFT BREAST
LOCATION SIMPLE: ABDOMEN
LOCATION SIMPLE: LEFT CHEEK
LOCATION SIMPLE: RIGHT THIGH
LOCATION SIMPLE: LEFT UPPER ARM
LOCATION SIMPLE: LEFT UPPER BACK
LOCATION SIMPLE: LEFT THIGH
LOCATION SIMPLE: GROIN

## 2024-12-19 NOTE — PROCEDURE: DIAGNOSIS COMMENT
Render Risk Assessment In Note?: no
Detail Level: Simple
Comment: Monitor above right eyebrow (pt says no change in color and he recently scratched the area so redness is from that). Advised biopsy to R/O LMM but pt wishes to monitor

## 2024-12-19 NOTE — PROCEDURE: PRESCRIPTION MEDICATION MANAGEMENT
Render In Strict Bullet Format?: No
Continue Regimen: Lotrisone cream AAA BID 2 weeks/month. Repeat for flares
Detail Level: Zone
Plan: Call if not improving
Initiate Treatment: Lotrisone cream AAA BID 2 weeks on 2 weeks off PRN flares

## 2025-06-23 ENCOUNTER — APPOINTMENT (OUTPATIENT)
Dept: URBAN - NONMETROPOLITAN AREA CLINIC 59 | Age: 65
Setting detail: DERMATOLOGY
End: 2025-06-23

## 2025-06-23 DIAGNOSIS — L82.1 OTHER SEBORRHEIC KERATOSIS: ICD-10-CM

## 2025-06-23 DIAGNOSIS — Z87.2 PERSONAL HISTORY OF DISEASES OF THE SKIN AND SUBCUTANEOUS TISSUE: ICD-10-CM

## 2025-06-23 DIAGNOSIS — D22 MELANOCYTIC NEVI: ICD-10-CM

## 2025-06-23 DIAGNOSIS — Z85.828 PERSONAL HISTORY OF OTHER MALIGNANT NEOPLASM OF SKIN: ICD-10-CM

## 2025-06-23 DIAGNOSIS — L81.4 OTHER MELANIN HYPERPIGMENTATION: ICD-10-CM

## 2025-06-23 DIAGNOSIS — Z71.89 OTHER SPECIFIED COUNSELING: ICD-10-CM

## 2025-06-23 DIAGNOSIS — D18.0 HEMANGIOMA: ICD-10-CM

## 2025-06-23 PROBLEM — D18.01 HEMANGIOMA OF SKIN AND SUBCUTANEOUS TISSUE: Status: ACTIVE | Noted: 2025-06-23

## 2025-06-23 PROBLEM — D22.62 MELANOCYTIC NEVI OF LEFT UPPER LIMB, INCLUDING SHOULDER: Status: ACTIVE | Noted: 2025-06-23

## 2025-06-23 PROBLEM — D22.72 MELANOCYTIC NEVI OF LEFT LOWER LIMB, INCLUDING HIP: Status: ACTIVE | Noted: 2025-06-23

## 2025-06-23 PROBLEM — D22.71 MELANOCYTIC NEVI OF RIGHT LOWER LIMB, INCLUDING HIP: Status: ACTIVE | Noted: 2025-06-23

## 2025-06-23 PROBLEM — D22.5 MELANOCYTIC NEVI OF TRUNK: Status: ACTIVE | Noted: 2025-06-23

## 2025-06-23 PROBLEM — D22.39 MELANOCYTIC NEVI OF OTHER PARTS OF FACE: Status: ACTIVE | Noted: 2025-06-23

## 2025-06-23 PROCEDURE — OTHER COUNSELING: OTHER

## 2025-06-23 PROCEDURE — OTHER MIPS QUALITY: OTHER

## 2025-06-23 PROCEDURE — OTHER SUNSCREEN RECOMMENDATIONS: OTHER

## 2025-06-23 PROCEDURE — 99213 OFFICE O/P EST LOW 20 MIN: CPT

## 2025-06-23 ASSESSMENT — LOCATION ZONE DERM
LOCATION ZONE: FACE
LOCATION ZONE: ARM
LOCATION ZONE: TRUNK
LOCATION ZONE: LEG

## 2025-06-23 ASSESSMENT — LOCATION SIMPLE DESCRIPTION DERM
LOCATION SIMPLE: LEFT THIGH
LOCATION SIMPLE: RIGHT THIGH
LOCATION SIMPLE: CHEST
LOCATION SIMPLE: LEFT CHEEK
LOCATION SIMPLE: ABDOMEN
LOCATION SIMPLE: LEFT UPPER BACK
LOCATION SIMPLE: LEFT UPPER ARM
LOCATION SIMPLE: LEFT BREAST

## 2025-06-23 ASSESSMENT — LOCATION DETAILED DESCRIPTION DERM
LOCATION DETAILED: RIGHT ANTERIOR PROXIMAL THIGH
LOCATION DETAILED: RIGHT MEDIAL SUPERIOR CHEST
LOCATION DETAILED: PERIUMBILICAL SKIN
LOCATION DETAILED: LEFT MEDIAL BREAST 8-9:00 REGION
LOCATION DETAILED: EPIGASTRIC SKIN
LOCATION DETAILED: LEFT ANTECUBITAL SKIN
LOCATION DETAILED: LEFT ANTERIOR PROXIMAL THIGH
LOCATION DETAILED: LEFT INFERIOR MEDIAL UPPER BACK
LOCATION DETAILED: LEFT INFERIOR MEDIAL MALAR CHEEK
LOCATION DETAILED: LEFT ANTERIOR PROXIMAL UPPER ARM
LOCATION DETAILED: RIGHT LATERAL SUPERIOR CHEST